# Patient Record
Sex: FEMALE | Race: BLACK OR AFRICAN AMERICAN | NOT HISPANIC OR LATINO | Employment: OTHER | URBAN - METROPOLITAN AREA
[De-identification: names, ages, dates, MRNs, and addresses within clinical notes are randomized per-mention and may not be internally consistent; named-entity substitution may affect disease eponyms.]

---

## 2022-01-19 ENCOUNTER — PATIENT MESSAGE (OUTPATIENT)
Dept: ADMINISTRATIVE | Facility: OTHER | Age: 57
End: 2022-01-19
Payer: OTHER GOVERNMENT

## 2022-01-20 ENCOUNTER — TELEPHONE (OUTPATIENT)
Dept: PULMONOLOGY | Facility: CLINIC | Age: 57
End: 2022-01-20
Payer: OTHER GOVERNMENT

## 2022-01-20 NOTE — TELEPHONE ENCOUNTER
Spoke with patient she was inquiring as when her appointment was for her [facility] referral that is still in process. Notified her that once the [facility] referral is completed and in the system we would contact her with appointment date and time. LG

## 2022-01-28 DIAGNOSIS — J45.909 ASTHMA, UNSPECIFIED ASTHMA SEVERITY, UNSPECIFIED WHETHER COMPLICATED, UNSPECIFIED WHETHER PERSISTENT: Primary | ICD-10-CM

## 2022-02-01 ENCOUNTER — PATIENT MESSAGE (OUTPATIENT)
Dept: PULMONOLOGY | Facility: CLINIC | Age: 57
End: 2022-02-01
Payer: OTHER GOVERNMENT

## 2022-02-01 DIAGNOSIS — J45.909 ASTHMA: Primary | ICD-10-CM

## 2022-02-07 ENCOUNTER — OFFICE VISIT (OUTPATIENT)
Dept: PULMONOLOGY | Facility: CLINIC | Age: 57
End: 2022-02-07
Payer: OTHER GOVERNMENT

## 2022-02-07 ENCOUNTER — CLINICAL SUPPORT (OUTPATIENT)
Dept: PULMONOLOGY | Facility: CLINIC | Age: 57
End: 2022-02-07
Payer: OTHER GOVERNMENT

## 2022-02-07 ENCOUNTER — DOCUMENTATION ONLY (OUTPATIENT)
Dept: PULMONOLOGY | Facility: CLINIC | Age: 57
End: 2022-02-07

## 2022-02-07 VITALS
OXYGEN SATURATION: 97 % | WEIGHT: 238.13 LBS | SYSTOLIC BLOOD PRESSURE: 178 MMHG | HEART RATE: 111 BPM | RESPIRATION RATE: 18 BRPM | DIASTOLIC BLOOD PRESSURE: 82 MMHG

## 2022-02-07 DIAGNOSIS — J84.9 INTERSTITIAL PULMONARY DISEASE, UNSPECIFIED: ICD-10-CM

## 2022-02-07 DIAGNOSIS — J45.909 ASTHMA: ICD-10-CM

## 2022-02-07 DIAGNOSIS — J45.30 MILD PERSISTENT REACTIVE AIRWAY DISEASE WITHOUT COMPLICATION: Primary | ICD-10-CM

## 2022-02-07 PROCEDURE — 94729 PR C02/MEMBANE DIFFUSE CAPACITY: ICD-10-PCS | Mod: S$GLB,,, | Performed by: INTERNAL MEDICINE

## 2022-02-07 PROCEDURE — 99204 PR OFFICE/OUTPT VISIT, NEW, LEVL IV, 45-59 MIN: ICD-10-PCS | Mod: 25,S$GLB,, | Performed by: INTERNAL MEDICINE

## 2022-02-07 PROCEDURE — 94729 DIFFUSING CAPACITY: CPT | Mod: S$GLB,,, | Performed by: INTERNAL MEDICINE

## 2022-02-07 PROCEDURE — 94060 EVALUATION OF WHEEZING: CPT | Mod: S$GLB,,, | Performed by: INTERNAL MEDICINE

## 2022-02-07 PROCEDURE — 94060 PR EVAL OF BRONCHOSPASM: ICD-10-PCS | Mod: S$GLB,,, | Performed by: INTERNAL MEDICINE

## 2022-02-07 PROCEDURE — 99204 OFFICE O/P NEW MOD 45 MIN: CPT | Mod: 25,S$GLB,, | Performed by: INTERNAL MEDICINE

## 2022-02-07 PROCEDURE — 94726 PULM FUNCT TST PLETHYSMOGRAP: ICD-10-PCS | Mod: S$GLB,,, | Performed by: INTERNAL MEDICINE

## 2022-02-07 PROCEDURE — 94726 PLETHYSMOGRAPHY LUNG VOLUMES: CPT | Mod: S$GLB,,, | Performed by: INTERNAL MEDICINE

## 2022-02-07 RX ORDER — BUDESONIDE AND FORMOTEROL FUMARATE DIHYDRATE 80; 4.5 UG/1; UG/1
1 AEROSOL RESPIRATORY (INHALATION) 2 TIMES DAILY
Qty: 0.005 G | Refills: 11 | Status: SHIPPED | OUTPATIENT
Start: 2022-02-07 | End: 2022-02-10 | Stop reason: SDUPTHER

## 2022-02-07 RX ORDER — ALBUTEROL SULFATE 90 UG/1
2 AEROSOL, METERED RESPIRATORY (INHALATION) EVERY 6 HOURS PRN
Qty: 18 G | Refills: 11 | Status: SHIPPED | OUTPATIENT
Start: 2022-02-07 | End: 2022-02-10 | Stop reason: SDUPTHER

## 2022-02-07 RX ORDER — ATORVASTATIN CALCIUM 20 MG/1
TABLET, FILM COATED ORAL
COMMUNITY
Start: 2020-04-30

## 2022-02-07 RX ORDER — METFORMIN HYDROCHLORIDE 500 MG/1
TABLET, EXTENDED RELEASE ORAL
COMMUNITY
Start: 2020-04-30

## 2022-02-07 RX ORDER — ASPIRIN 81 MG/1
TABLET ORAL
COMMUNITY
Start: 2020-01-31

## 2022-02-07 RX ORDER — LEVOTHYROXINE SODIUM 50 UG/1
TABLET ORAL
COMMUNITY
Start: 2020-04-30

## 2022-02-07 RX ORDER — LIDOCAINE 50 MG/G
PATCH TOPICAL
COMMUNITY
Start: 2020-04-30

## 2022-02-07 RX ORDER — MONTELUKAST SODIUM 10 MG/1
10 TABLET ORAL NIGHTLY
Qty: 30 TABLET | Refills: 0 | Status: SHIPPED | OUTPATIENT
Start: 2022-02-07 | End: 2022-02-15 | Stop reason: SDUPTHER

## 2022-02-07 NOTE — PROGRESS NOTES
Subjective:    Patient Identification:   Patient ID: Christy Miller is a 56 y.o. female.    Referring Provider:  No ref. provider found     Chief Complaint:  Asthma, Shortness of Breath, and Wheezing (With activity)      History of Present Illness:    Christy Miller is a 56 y.o. female who presents with for the evaluation of above-mentioned problems.  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  Since that move she had noted increased exertional dyspnea.  On walking from her parking garage to her office which is about 1 mi distance she gets short of breath and has wheezing.  She walks faster than her normal pace to avoid smoke when these problems happens.  She has noted that on exposure to smoke her productive cough and wheezing associated with shortness of breath return.  She noted shortness of breath, cough and wheezing when she was in  and stationed in [place].  Cough is mostly associated with clear phlegm and sometimes can be with a yellow tinged.  Mostly this will happen or exertion such as running.  She was given albuterol inhalers which did help her exercise-induced shortness of breath.  She also used to be on Singulair which she is out at this moment.  She does take Zyrtec and Flonase for her allergies.  She had seen an allergist in 2013 but a specific allergen was not pinpoint it.  She was advised to continue take antihistamines.  She currently does not have albuterol inhaler as she is not been actively running to need 1.   In last 1 year she has had no urgent care, primary care or ER visits for shortness of breath.  She has not been on steroids or antibiotics either.  She has a CT scan of the chest performed in November 2014 and there is a report for it which is hard to read but shows that she had mild apical fibroglandular disease and calcified lymph nodes and granulomas.  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  She  denies any dizziness, chest pain or lower extremity swelling.  Chest auscultation did not reveal any added sound.  Noted that patient's blood pressure and heart rate were significantly elevated while in the clinic.  She is currently not taking any antihypertensive    Review of Systems:  Review of Systems   Constitutional: Negative for fever, chills, weight loss, weight gain, activity change, appetite change, fatigue, night sweats and weakness.   HENT: Negative for nosebleeds, postnasal drip, rhinorrhea, sinus pressure, sore throat, trouble swallowing, voice change, congestion, ear pain and hearing loss.    Eyes: Negative for redness and itching.   Respiratory: Positive for cough, sputum production and dyspnea on extertion. Negative for hemoptysis, choking, chest tightness, shortness of breath, wheezing, orthopnea, previous hospitialization due to pulmonary problems, asthma nighttime symptoms, pleurisy, use of rescue inhaler and Paroxysmal Nocturnal Dyspnea.    Cardiovascular: Negative for chest pain, palpitations and leg swelling.   Genitourinary: Negative for difficulty urinating and hematuria.   Endocrine: Negative for polydipsia, polyphagia, cold intolerance, heat intolerance and polyuria.    Musculoskeletal: Negative for arthralgias, back pain, gait problem, joint swelling and myalgias.   Skin: Negative for rash.   Gastrointestinal: Negative for nausea, vomiting, abdominal pain, abdominal distention and acid reflux.   Neurological: Negative for dizziness, syncope, weakness, light-headedness and headaches.   Hematological: Negative for adenopathy. Does not bruise/bleed easily and no excessive bruising.   Psychiatric/Behavioral: Negative for confusion and sleep disturbance. The patient is not nervous/anxious.          Allergies: Review of patient's allergies indicates:  No Known Allergies    Medications:      Past Medical History:      Past Medical History:   Diagnosis Date    Asthma     Bronchiectasis      Diabetes mellitus     Hypertension     Pneumonia     Rash     Thyroid disease        Family History:      Family History   Problem Relation Age of Onset    Cancer Mother     Hypertension Mother     Heart failure Mother     Stroke Mother     Heart attack Mother     Pulmonary embolism Mother     Heart attack Father     Diabetes Father     Heart failure Sister     Diabetes Sister     Diabetes Maternal Aunt     Hypertension Maternal Aunt     Diabetes Paternal Uncle     Hypertension Paternal Uncle     Diabetes Maternal Grandmother     Hypertension Maternal Grandmother     Hypertension Maternal Grandfather     Diabetes Paternal Grandmother     Hypertension Paternal Grandfather         Social History:      History reviewed. No pertinent surgical history.    Physical Exam:  Vitals:    02/07/22 1114   BP: (!) 178/82   Pulse: (!) 111   Resp: 18     Physical Exam   Constitutional: She is oriented to person, place, and time. She appears not cachectic. No distress.   HENT:   Head: Normocephalic.   Right Ear: External ear normal.   Left Ear: External ear normal.   Nose: Nose normal. No mucosal edema. No polyps.   Mouth/Throat: Oropharynx is clear and moist. Normal dentition. No oropharyngeal exudate.   Neck: No JVD present. No tracheal deviation present. No thyromegaly present.   Cardiovascular: Normal rate, regular rhythm, normal heart sounds and intact distal pulses. Exam reveals no gallop and no friction rub.   No murmur heard.  Pulmonary/Chest: Normal expansion, symmetric chest wall expansion, effort normal and breath sounds normal. No stridor. No respiratory distress. She has no decreased breath sounds. She has no wheezes. She has no rhonchi. She has no rales. Chest wall is not dull to percussion. She exhibits no tenderness. Negative for egophony. Negative for tactile fremitus.   Abdominal: Soft. Bowel sounds are normal. She exhibits no distension and no mass. There is no hepatosplenomegaly. There is  no abdominal tenderness. There is no rebound and no guarding. No hernia.   Musculoskeletal:         General: No tenderness, deformity or edema. Normal range of motion.      Cervical back: Normal range of motion and neck supple.   Lymphadenopathy: No supraclavicular adenopathy is present.     She has no cervical adenopathy.     She has no axillary adenopathy.   Neurological: She is alert and oriented to person, place, and time. She has normal reflexes. She displays normal reflexes. No cranial nerve deficit. She exhibits normal muscle tone.   Skin: Skin is warm and dry. No rash noted. She is not diaphoretic. No cyanosis or erythema. No pallor. Nails show no clubbing.   Psychiatric: She has a normal mood and affect. Her behavior is normal. Judgment and thought content normal.                     Accessory Clinical Data:  Chest x-ray:  Was personally reviewed without any acute cardiopulmonary disease    CT scan:  Report from 2014 dictated under HPI    PFTs:  Normal profile    6MWT:  None available    TTE:  None available    Lab data:    All radiographic imaging of the chest, PFT tracings/data, and 6MWT data have been independently reviewed and interpreted unless otherwise specified.     Assessment and Plan:        Problem List Items Addressed This Visit    None     Visit Diagnoses     Mild persistent reactive airway disease without complication    -  Primary    Relevant Medications    montelukast (SINGULAIR) 10 mg tablet    Interstitial pulmonary disease, unspecified        Relevant Orders    CT Chest Without Contrast         Orders Placed This Encounter   Procedures    CT Chest Without Contrast     Standing Status:   Future     Number of Occurrences:   1     Standing Expiration Date:   2/7/2023     Scheduling Instructions:      Please do HRCT with inspiratory and expiratory films     Order Specific Question:   May the Radiologist modify the order per protocol to meet the clinical needs of the patient?     Answer:    Yes      Obtain high-resolution CT scan and get [facility].  She is at risk for obstructive bronchial it was but clinically no findings were noted on examination or available imaging/PFTs.  Fortunately her PFTs do not reveal any obstructive lung disease.  I think most of the symptoms are stemming from underlying reactive airway disease/asthma which seems to be mild but persistent and related to exercise and certain triggers.  Continue with Zyrtec and Flonase and we add Singulair 10 mg p.o. daily.  Also start low-dose inhaled corticosteroid and long-acting beta agonist combination twice a day.  Proper technique of inhaler utilization was reviewed with the patient.  Will also give p.r.n. albuterol inhaler specially before her planned walk from parking to her job.    Try to avoid triggers to minimize symptoms.      No follow-ups on file.  Thank you very much for involving me in the care of this patient.  Please do not hesitate to reach me if you have any further questions or concerns.    This note is dictated on M*Modal word recognition program.  There are word recognition mistakes that are occasionally missed on review.

## 2022-02-09 ENCOUNTER — TELEPHONE (OUTPATIENT)
Dept: PULMONOLOGY | Facility: CLINIC | Age: 57
End: 2022-02-09
Payer: OTHER GOVERNMENT

## 2022-02-10 RX ORDER — BUDESONIDE AND FORMOTEROL FUMARATE DIHYDRATE 80; 4.5 UG/1; UG/1
1 AEROSOL RESPIRATORY (INHALATION) 2 TIMES DAILY
Qty: 0.005 G | Refills: 11 | OUTPATIENT
Start: 2022-02-10 | End: 2022-02-15 | Stop reason: SDUPTHER

## 2022-02-10 RX ORDER — ALBUTEROL SULFATE 90 UG/1
2 AEROSOL, METERED RESPIRATORY (INHALATION) EVERY 6 HOURS PRN
Qty: 18 G | Refills: 11 | OUTPATIENT
Start: 2022-02-10 | End: 2022-02-15 | Stop reason: SDUPTHER

## 2022-02-14 ENCOUNTER — PATIENT MESSAGE (OUTPATIENT)
Dept: PULMONOLOGY | Facility: CLINIC | Age: 57
End: 2022-02-14
Payer: OTHER GOVERNMENT

## 2022-02-14 ENCOUNTER — TELEPHONE (OUTPATIENT)
Dept: PULMONOLOGY | Facility: CLINIC | Age: 57
End: 2022-02-14
Payer: OTHER GOVERNMENT

## 2022-02-14 NOTE — TELEPHONE ENCOUNTER
[facility] had [facility] and [facility] along with documentation for meds. Also Im emailing the letter to Mrs. Miller for travel/work. LG

## 2022-02-15 ENCOUNTER — TELEPHONE (OUTPATIENT)
Dept: PULMONOLOGY | Facility: CLINIC | Age: 57
End: 2022-02-15
Payer: OTHER GOVERNMENT

## 2022-02-15 DIAGNOSIS — J45.30 MILD PERSISTENT REACTIVE AIRWAY DISEASE WITHOUT COMPLICATION: ICD-10-CM

## 2022-02-15 RX ORDER — ALBUTEROL SULFATE 90 UG/1
2 AEROSOL, METERED RESPIRATORY (INHALATION) EVERY 6 HOURS PRN
Qty: 2 G | Refills: 11 | OUTPATIENT
Start: 2022-02-15 | End: 2022-02-16

## 2022-02-15 RX ORDER — ALBUTEROL SULFATE 90 UG/1
2 AEROSOL, METERED RESPIRATORY (INHALATION) EVERY 6 HOURS PRN
Qty: 18 G | Refills: 11 | OUTPATIENT
Start: 2022-02-15 | End: 2022-02-15 | Stop reason: SDUPTHER

## 2022-02-15 RX ORDER — BUDESONIDE AND FORMOTEROL FUMARATE DIHYDRATE 80; 4.5 UG/1; UG/1
1 AEROSOL RESPIRATORY (INHALATION) 2 TIMES DAILY
Qty: 0.005 G | Refills: 11 | OUTPATIENT
Start: 2022-02-15 | End: 2022-02-15 | Stop reason: SDUPTHER

## 2022-02-15 RX ORDER — BUDESONIDE AND FORMOTEROL FUMARATE DIHYDRATE 80; 4.5 UG/1; UG/1
1 AEROSOL RESPIRATORY (INHALATION) 2 TIMES DAILY
Qty: 0.005 G | Refills: 11 | OUTPATIENT
Start: 2022-02-15 | End: 2022-02-16 | Stop reason: ALTCHOICE

## 2022-02-15 RX ORDER — ALBUTEROL SULFATE 90 UG/1
2 AEROSOL, METERED RESPIRATORY (INHALATION) EVERY 6 HOURS PRN
Qty: 18 G | Refills: 11 | Status: CANCELLED | OUTPATIENT
Start: 2022-02-15 | End: 2022-11-12

## 2022-02-15 RX ORDER — MONTELUKAST SODIUM 10 MG/1
10 TABLET ORAL NIGHTLY
Qty: 30 TABLET | Refills: 0 | Status: SHIPPED | OUTPATIENT
Start: 2022-02-15 | End: 2022-03-17

## 2022-02-15 NOTE — TELEPHONE ENCOUNTER
Returned call spoke with Annetta at pharmacy and Mrs. Harrison as well.Provided information needed. LG  ----- Message from Miriam Klein sent at 2/15/2022  9:12 AM CST -----  Agnus with the [facility] is trying to fill a medication for a patient but she needs the doctor tax id number to add him to the system before she can move forward. Please give her a call back at [phone]

## 2022-02-16 ENCOUNTER — TELEPHONE (OUTPATIENT)
Dept: PULMONOLOGY | Facility: CLINIC | Age: 57
End: 2022-02-16
Payer: OTHER GOVERNMENT

## 2022-02-16 RX ORDER — FLUTICASONE PROPIONATE AND SALMETEROL 100; 50 UG/1; UG/1
1 POWDER RESPIRATORY (INHALATION) 2 TIMES DAILY
Qty: 60 EACH | Refills: 6 | Status: SHIPPED | OUTPATIENT
Start: 2022-02-16 | End: 2023-02-16

## 2022-02-16 RX ORDER — ALBUTEROL SULFATE 90 UG/1
2 AEROSOL, METERED RESPIRATORY (INHALATION) EVERY 6 HOURS PRN
Qty: 18 G | Refills: 0 | Status: SHIPPED | OUTPATIENT
Start: 2022-02-16 | End: 2023-02-16

## 2022-02-16 NOTE — TELEPHONE ENCOUNTER
Called patient after confirming with [facility] speaking to Jose. After multiple calls to pharmacy with multiple escript, and multiple fax attempts over the past several days by multiple Pulm. staff for her medication that they finally received and filled all 3 of her meds. ROSIE

## 2022-02-16 NOTE — TELEPHONE ENCOUNTER
Returned call to Jose and re faxed the albuterol inhaler script , verified he did receive the script in fact he had got all 3 and they were filled. Also called Ms. Miller and notified her as well. LG  ----- Message from Miriam Klein sent at 2/16/2022 12:25 PM CST -----  [facility] stated that he only received one prescription for this patient (wixella) and that he needs the second prescription. He would like for [place] to give him a call back at  [phone]

## 2022-05-02 ENCOUNTER — OFFICE VISIT (OUTPATIENT)
Dept: PULMONOLOGY | Facility: CLINIC | Age: 57
End: 2022-05-02
Payer: OTHER GOVERNMENT

## 2022-05-02 VITALS
WEIGHT: 238.13 LBS | RESPIRATION RATE: 18 BRPM | OXYGEN SATURATION: 97 % | DIASTOLIC BLOOD PRESSURE: 82 MMHG | HEART RATE: 107 BPM | SYSTOLIC BLOOD PRESSURE: 178 MMHG

## 2022-05-02 DIAGNOSIS — J45.909 MILD REACTIVE AIRWAYS DISEASE, UNSPECIFIED WHETHER PERSISTENT: ICD-10-CM

## 2022-05-02 PROCEDURE — 99214 OFFICE O/P EST MOD 30 MIN: CPT | Mod: S$GLB,,,

## 2022-05-02 PROCEDURE — 99214 PR OFFICE/OUTPT VISIT, EST, LEVL IV, 30-39 MIN: ICD-10-PCS | Mod: S$GLB,,,

## 2022-05-02 RX ORDER — MONTELUKAST SODIUM 4 MG/1
10 TABLET, CHEWABLE ORAL NIGHTLY
COMMUNITY

## 2022-05-02 NOTE — PROGRESS NOTES
"Subjective:    Patient Identification:  Patient ID: Christy Miller is a 56 y.o. female.    Referring Provider:  No ref. provider found     Chief Complaint:  Asthma      History of Present Illness:    Christy Miller is a 56 y.o. female who presents for follow up of her mild reactive airway disease. Last visit she was scheduled to have a repeat high resolution CT scan to compare to her previous one that was done in 2014. She was also started on singulair and was given budesonide/formoterol fumarate 80-4.5 1 puff BID as a maintenance inhaler, and was also given  PRN albuterol inhaler. She has been using her maintenance advair inhaler daily, she [facility]. She said that she never has to use her rescue inhaler. Her repeat CT chest was reviewed and did not mention the "mild apical fibroglandular disease and calcified lymph nodes and granulomas" that were seen on her last CT in 2014. CT impression was a normal exam. Her symptoms are well controlled on her current regimen, we discussed following up on an as needed basis since she travels from [place] and she has been [facility].     Review of Systems:  Review of Systems   Constitutional: Negative for fever, chills, appetite change, night sweats and weakness.   HENT: Negative for postnasal drip, rhinorrhea, sore throat, trouble swallowing, voice change, congestion and ear pain.    Respiratory: Negative for hemoptysis.    Cardiovascular: Negative for chest pain, palpitations and leg swelling.   Genitourinary: Negative for difficulty urinating and hematuria.   Endocrine: Negative for polydipsia, polyphagia, cold intolerance, heat intolerance and polyuria.    Musculoskeletal: Negative for joint swelling and myalgias.   Skin: Negative for rash.   Gastrointestinal: Negative for nausea, vomiting, abdominal pain and abdominal distention.   Neurological: Negative for dizziness, syncope, light-headedness and headaches. "   Psychiatric/Behavioral: Negative for confusion and sleep disturbance. The patient is not nervous/anxious.        Allergies: Review of patient's allergies indicates:  No Known Allergies    Medications:      Past Medical History:      Past Medical History:   Diagnosis Date    Allergies 06/01/2005    Asthma     Bronchiectasis     Diabetes mellitus     Hypertension     Pneumonia     Rash     Thyroid disease        Family History:      Family History   Problem Relation Age of Onset    Cancer Mother     Hypertension Mother     Heart failure Mother     Stroke Mother     Heart attack Mother     Pulmonary embolism Mother     Heart attack Father     Diabetes Father     Heart failure Sister     Diabetes Sister     Diabetes Maternal Aunt     Hypertension Maternal Aunt     Diabetes Paternal Uncle     Hypertension Paternal Uncle     Diabetes Maternal Grandmother     Hypertension Maternal Grandmother     Hypertension Maternal Grandfather     Diabetes Paternal Grandmother     Hypertension Paternal Grandfather         Social History:      No past surgical history on file.    Physical Exam:  Vitals:    05/02/22 1132   BP: (!) 178/82   Pulse: 107   Resp: 18     Physical Exam   Constitutional: She is oriented to person, place, and time. She appears not cachectic. No distress.   HENT:   Head: Normocephalic.   Right Ear: External ear normal.   Left Ear: External ear normal.   Nose: Nose normal. No mucosal edema. No polyps.   Mouth/Throat: Oropharynx is clear and moist. Normal dentition. No oropharyngeal exudate. Mallampati Score: III.   Neck: No JVD present. No tracheal deviation present. No thyromegaly present.   Cardiovascular: Normal rate, regular rhythm, normal heart sounds and intact distal pulses. Exam reveals no gallop and no friction rub.   No murmur heard.  Pulmonary/Chest: Normal expansion, symmetric chest wall expansion, effort normal and breath sounds normal. No stridor. No respiratory distress.  She has no decreased breath sounds. She has no wheezes. She has no rhonchi. She has no rales. Chest wall is not dull to percussion. She exhibits no tenderness. Negative for egophony. Negative for tactile fremitus.   Abdominal: Soft. Bowel sounds are normal. She exhibits no distension and no mass. There is no hepatosplenomegaly. There is no abdominal tenderness. There is no rebound and no guarding. No hernia.   Musculoskeletal:         General: No tenderness, deformity or edema. Normal range of motion.      Cervical back: Normal range of motion and neck supple.   Lymphadenopathy: No supraclavicular adenopathy is present.     She has no cervical adenopathy.     She has no axillary adenopathy.   Neurological: She is alert and oriented to person, place, and time. She has normal reflexes. She displays normal reflexes. No cranial nerve deficit. She exhibits normal muscle tone.   Skin: Skin is warm and dry. No rash noted. She is not diaphoretic. No cyanosis or erythema. No pallor. Nails show no clubbing.   Psychiatric: She has a normal mood and affect. Her behavior is normal. Judgment and thought content normal.           No results found for: PREFEV1, YLO0DWNCIV, PREFVC, FVCPREREF, VOQGHY0WPN, KZH9QSMBQLH, GMGM5LOV, AJQG1DHO, PREDLCO, DLCOSBPRRF, DLCOADJPRE, DLCOCSBRPRRF, POSTFEV1, POSTFVC, JJIWGMT2CGP   CT Chest Without Contrast                                RADIOLOGY REPORT        PT NAME: ABHIJIT BEST      [facility]     : 1965 F 56             [address]    ACCT: [id number]                                              [facility] Rec #: [id number]                                        [id number]    Patient Location: [facility].RAD/             Procedure: CHEST THORAX WO CONT    REQUISITION #: [id number]      REPORT #: [id number]           DATE OF EXAM: 22    TIME OF EXAM: 0915       CHEST THORAX WO CONT    CMS MANDATED QUALITY DATA CT RADIATION 436    *All CT scans at this  facility uses dose modulation and/or weight-based   dosing when appropriate to reduce radiation dose to as low as reasonably   achievable.        CLINICAL DATA:    Interstitial lung disease.        TECHNIQUE:    Acquisition: Helical scan slices from the apex through the diaphragms. The   study was performed both prone and supine    Reconstruction: Axial 1 mm thick noncontiguous images.    Contrast:    IV: None given.    Oral: None given.    Phases: None contrasted.    Limitations: No technical limitations.    Estimated radiation dose: 21.0 mSv.        COMPARISON:    No prior relevant studies.        FINDINGS:    Lungs and large airways:    Septal thickening: None seen.    Pleural effusions: None seen.    Groundglass opacities: None seen.    Tree-in-bud: None seen.    Honeycombing: None seen.    Bronchiectasis: None seen.    Air-trapping: None seen.    Nodules: None seen.    Mosaic attenuation: None seen.    Other: No other pathology.        Heart: Normal.        Thoracic aorta: Normal.        Pulmonary vasculature: Normal.        Mediastinal and hilar structures: Normal.        Axilla, chest wall and lower neck: Normal.        Upper abdomen: Normal.        Osseous structures: Normal.        Additional findings: None seen.        IMPRESSION:    1.  Normal exam.            This document was created using a voice recognition transcribing system.   Incorrect words or phrases may have been missed during proof reading. Please   interpret accordingly or contact the radiologist for clarification if   necessary.        DICTATING PHYSICIAN:  IVA LUNA MD                   Date Dictated: 03/21/22 1002        Signed By:  IVA LUNA MD <Electronically signed by IVA LUNA MD in OV>    Date Signed:  03/21/22 1005     CC: SALLY ANDREA MD ; SALLY ANDREA MD      ADMITTING PHYSICIAN:                                                                                                    ORDERING PHY:  SALLY ANDREA MD                                                                                                                                                      ATTENDING PHY: SALLY ANDREA MD    Patient Status:  REG CLI    Admit Service Date: 03/21/22                Accessory Clinical Data:  Chest x-ray:    CT scan: dictated under HPI    PFTs:     6MWT:     TTE:    Lab data:    All radiographic imaging of the chest, PFT tracings/data, and 6MWT data have been independently reviewed and interpreted unless otherwise specified.     Assessment and Plan:      Problem List Items Addressed This Visit        Pulmonary    Reactive airway disease    Current Assessment & Plan     Continue with Advair daily.  Does not need any refills.  Will follow up as needed  Normal follow up CT chest.                No orders of the defined types were placed in this encounter.           Follow up if symptoms worsen or fail to improve.

## 2022-05-02 NOTE — ASSESSMENT & PLAN NOTE
Continue with Advair daily.  Does not need any refills.  Will follow up as needed  Normal follow up CT chest.

## 2022-05-19 ENCOUNTER — PATIENT MESSAGE (OUTPATIENT)
Dept: PULMONOLOGY | Facility: CLINIC | Age: 57
End: 2022-05-19
Payer: OTHER GOVERNMENT

## 2022-05-21 ENCOUNTER — PATIENT MESSAGE (OUTPATIENT)
Dept: PULMONOLOGY | Facility: CLINIC | Age: 57
End: 2022-05-21
Payer: OTHER GOVERNMENT

## 2023-03-28 ENCOUNTER — PATIENT MESSAGE (OUTPATIENT)
Dept: RESEARCH | Facility: HOSPITAL | Age: 58
End: 2023-03-28
Payer: OTHER GOVERNMENT